# Patient Record
Sex: FEMALE | Race: WHITE | Employment: FULL TIME | URBAN - METROPOLITAN AREA
[De-identification: names, ages, dates, MRNs, and addresses within clinical notes are randomized per-mention and may not be internally consistent; named-entity substitution may affect disease eponyms.]

---

## 2017-03-15 ENCOUNTER — OFFICE VISIT (OUTPATIENT)
Dept: ORTHOPEDIC SURGERY | Age: 47
End: 2017-03-15

## 2017-03-15 VITALS
WEIGHT: 142 LBS | SYSTOLIC BLOOD PRESSURE: 178 MMHG | BODY MASS INDEX: 24.24 KG/M2 | HEART RATE: 125 BPM | DIASTOLIC BLOOD PRESSURE: 94 MMHG | RESPIRATION RATE: 16 BRPM | HEIGHT: 64 IN | TEMPERATURE: 97.6 F | OXYGEN SATURATION: 98 %

## 2017-03-15 DIAGNOSIS — M48.02 CERVICAL STENOSIS OF SPINE: Primary | ICD-10-CM

## 2017-03-15 NOTE — PATIENT INSTRUCTIONS
Spinal Stenosis: Care Instructions  Your Care Instructions    Spinal stenosis is a narrowing of the canal that surrounds the spinal cord and nerve roots. The spine is made up of bones, or vertebrae. The spinal cord runs through an opening in the bones called the spinal canal. Sometimes, bone and ligament and disc tissue grow into this canal and press on the nerves that branch out from the spinal cord. This causes pain, numbness, or weakness--most often in the arms, legs, feet, and rear end (buttocks). Spinal stenosis can happen as you age. It can occur in the lower back or the neck. You may be able to treat spinal stenosis with pain medicine and exercises to keep your spine strong and flexible. Your doctor may suggest physical therapy. Some people try cortisone (corticosteroid) shots to reduce swelling. However, you may need surgery if your pain and numbness are so bad that you cannot do normal activities. Follow-up care is a key part of your treatment and safety. Be sure to make and go to all appointments, and call your doctor if you are having problems. It's also a good idea to know your test results and keep a list of the medicines you take. How can you care for yourself at home? · Ask your doctor if you can take an over-the-counter pain medicine, such as acetaminophen (Tylenol), ibuprofen (Advil, Motrin), or naproxen (Aleve). Be safe with medicines. Read and follow all instructions on the label. · Reach and stay at a healthy weight. Too much weight is hard on your spine. · Change positions when sitting to ease pain. For example, lean forward. This may reduce pressure on the spinal cord and its nerves. · Stretch your back muscles as your doctor or physical therapist recommends. Here are a few exercises to try if your doctor says it is okay. ¨ Lie on your back and gently pull one bent knee to your chest. Put that foot back on the floor and then pull the other knee to your chest.  ¨ Do pelvic tilts. Lie on your back with your knees bent. Tighten your stomach muscles. Pull your belly button (navel) in and up toward your ribs. You should feel like your back is pressing to the floor and your hips and pelvis are slightly lifting off the floor. Hold for 6 seconds while breathing smoothly. ¨ Press your back flat against a wall and slide down into a half squat. Hold for 6 seconds while breathing normally. When should you call for help? Call 911 anytime you think you may need emergency care. For example, call if:  · You are unable to move a leg at all. Call your doctor now or seek immediate medical care if:  · You have new or worse symptoms in your arms, legs, chest, belly, or buttocks. Symptoms may include:  ¨ Numbness or tingling. ¨ Weakness. ¨ Pain. · You lose bladder or bowel control. Watch closely for changes in your health, and be sure to contact your doctor if:  · You are not getting better as expected. Where can you learn more? Go to http://ngozi-stefani.info/. Enter V369 in the search box to learn more about \"Spinal Stenosis: Care Instructions. \"  Current as of: May 23, 2016  Content Version: 11.1  © 6795-6118 [employer]. Care instructions adapted under license by [employer] (which disclaims liability or warranty for this information). If you have questions about a medical condition or this instruction, always ask your healthcare professional. [place] [address] any warranty or liability for your use of this information.

## 2017-03-15 NOTE — MR AVS SNAPSHOT
Visit Information Date & Time Provider Department Dept. Phone Encounter #  
 3/15/2017  2:45 PM Iesha Armenta MD [facility] [phone] [id number] Follow-up Instructions Return if symptoms worsen or fail to improve. Upcoming Health Maintenance Date Due HEMOGLOBIN A1C Q6M 1968 LIPID PANEL Q1 1968 FOOT EXAM Q1 8/6/1978 MICROALBUMIN Q1 8/6/1978 EYE EXAM RETINAL OR DILATED Q1 8/6/1978 Pneumococcal 19-64 Medium Risk (1 of 1 - PPSV23) 8/6/1987 DTaP/Tdap/Td series (1 - Tdap) 8/6/1989 PAP AKA CERVICAL CYTOLOGY 8/6/1989 INFLUENZA AGE 9 TO ADULT 8/1/2016 COLONOSCOPY 1/14/2019 Allergies as of 3/15/2017  Review Complete On: 3/15/2017 By: Ruma Cervantes Severity Noted Reaction Type Reactions Aspirin High   Anaphylaxis Nsaids (Non-steroidal Anti-inflammatory Drug) High 08/01/2014    Anaphylaxis Pcn [Penicillins]    Shortness of Breath, Swelling Tramadol  03/15/2017    Shortness of Breath Asthma symptoms Current Immunizations  Never Reviewed No immunizations on file. Not reviewed this visit You Were Diagnosed With   
  
 Codes Comments Cervical stenosis of spine    -  Primary ICD-10-CM: M48.02 
ICD-9-CM: 723.0 Vitals BP Pulse Temp Resp Height(growth percentile) Weight(growth percentile) (!) 178/94 (!) 125 97.6 °F (36.4 °C) (Oral) 16 5' 4\" (1.626 m) 142 lb (64.4 kg) SpO2 BMI OB Status Smoking Status 98% 24.37 kg/m2 Hysterectomy Current Every Day Smoker Vitals History BMI and BSA Data Body Mass Index Body Surface Area  
 24.37 kg/m 2 1.71 m 2 Preferred Pharmacy Pharmacy Name Phone [place], [address] [phone] Your Updated Medication List  
  
   
This list is accurate as of: 3/15/17  2:51 PM.  Always use your most recent med list.  
  
  
  
  
 acetaminophen 500 mg tablet Commonly known as:  TYLENOL Take 1,000 mg by mouth every four (4) hours. AMBIEN 10 mg tablet Generic drug:  zolpidem Take  by mouth nightly as needed for Sleep. ATIVAN 1 mg tablet Generic drug:  LORazepam  
Take 2 mg by mouth three (3) times daily. atorvastatin 20 mg tablet Commonly known as:  LIPITOR  
  
 cyanocobalamin 1,000 mcg/mL injection Commonly known as:  VITAMIN B12  
1,000 mcg every seven (7) days. CYMBALTA 60 mg capsule Generic drug:  DULoxetine Take 60 mg by mouth two (2) times a day. KLOR-CON M20 20 mEq tablet Generic drug:  potassium chloride Take 20 mEq by mouth as needed. LASIX 40 mg tablet Generic drug:  furosemide Take 40 mg by mouth two (2) times daily as needed. ondansetron 4 mg disintegrating tablet Commonly known as:  ZOFRAN ODT Take 1 Tab by mouth every eight (8) hours as needed for Nausea. * oxyCODONE IR 20 mg immediate release tablet Commonly known as:  Corrinne Mitten Take 1 Tab by mouth every eight (8) hours as needed. Max Daily Amount: 60 mg.  
  
 * oxyCODONE IR 20 mg immediate release tablet Commonly known as:  Corrinne Mitten Take 1 Tab by mouth three (3) times daily. Max Daily Amount: 60 mg.  
  
 * oxyCODONE ER 15 mg ER tablet Commonly known as:  OxyCONTIN Take 1 Tab by mouth every twelve (12) hours. Max Daily Amount: 30 mg. Indications: CHRONIC PAIN  
  
 * OxyCONTIN 30 mg Tr12 Generic drug:  oxyCODONE  
  
 * oxyCODONE CR 30 mg CR tablet Commonly known as:  OxyCONTIN Take 1 Tab by mouth every twelve (12) hours. Max Daily Amount: 60 mg.  
  
 pantoprazole 40 mg tablet Commonly known as:  PROTONIX Take 40 mg by mouth two (2) times a day. PHENERGAN PO Take 25 mg by mouth as needed. pramipexole 0.5 mg tablet Commonly known as:  MIRAPEX Take 0.5 mg by mouth nightly. predniSONE 10 mg tablet Commonly known as:  Jackie Cardenas  
 Take 30 mg by mouth daily as needed. Dose pack as needed  
  
 tiZANidine 6 mg capsule Commonly known as:  ZANAFLEX  
  
 triamcinolone 55 mcg nasal inhaler Commonly known as:  NASACORT AQ  
2 Sprays by Both Nostrils route daily. VITAMIN D2 50,000 unit capsule Generic drug:  ergocalciferol Take 50,000 Units by mouth every seven (7) days. ZANTAC 300 mg tablet Generic drug:  raNITIdine Take 300 mg by mouth daily. Takes at night * Notice: This list has 5 medication(s) that are the same as other medications prescribed for you. Read the directions carefully, and ask your doctor or other care provider to review them with you. We Performed the Following REFERRAL TO PAIN MANAGEMENT [[id number] Custom] Comments:  
 Manage chronic pain Follow-up Instructions Return if symptoms worsen or fail to improve. Referral Information Referral ID Referred By Referred To  
  
 [id number] JIGAR Garland Available Visits Status Start Date End Date 1 New Request 3/15/17 3/15/18 If your referral has a status of pending review or denied, additional information will be sent to support the outcome of this decision. Patient Instructions Spinal Stenosis: Care Instructions Your Care Instructions Spinal stenosis is a narrowing of the canal that surrounds the spinal cord and nerve roots. The spine is made up of bones, or vertebrae. The spinal cord runs through an opening in the bones called the spinal canal. Sometimes, bone and ligament and disc tissue grow into this canal and press on the nerves that branch out from the spinal cord. This causes pain, numbness, or weaknessmost often in the arms, legs, feet, and rear end (buttocks). Spinal stenosis can happen as you age. It can occur in the lower back or the neck.  
You may be able to treat spinal stenosis with pain medicine and exercises to keep your spine strong and flexible. Your doctor may suggest physical therapy. Some people try cortisone (corticosteroid) shots to reduce swelling. However, you may need surgery if your pain and numbness are so bad that you cannot do normal activities. Follow-up care is a key part of your treatment and safety. Be sure to make and go to all appointments, and call your doctor if you are having problems. It's also a good idea to know your test results and keep a list of the medicines you take. How can you care for yourself at home? · Ask your doctor if you can take an over-the-counter pain medicine, such as acetaminophen (Tylenol), ibuprofen (Advil, Motrin), or naproxen (Aleve). Be safe with medicines. Read and follow all instructions on the label. · Reach and stay at a healthy weight. Too much weight is hard on your spine. · Change positions when sitting to ease pain. For example, lean forward. This may reduce pressure on the spinal cord and its nerves. · Stretch your back muscles as your doctor or physical therapist recommends. Here are a few exercises to try if your doctor says it is okay. ¨ Lie on your back and gently pull one bent knee to your chest. Put that foot back on the floor and then pull the other knee to your chest. 
¨ Do pelvic tilts. Lie on your back with your knees bent. Tighten your stomach muscles. Pull your belly button (navel) in and up toward your ribs. You should feel like your back is pressing to the floor and your hips and pelvis are slightly lifting off the floor. Hold for 6 seconds while breathing smoothly. ¨ Press your back flat against a wall and slide down into a half squat. Hold for 6 seconds while breathing normally. When should you call for help? Call 911 anytime you think you may need emergency care. For example, call if: 
· You are unable to move a leg at all. Call your doctor now or seek immediate medical care if: · You have new or worse symptoms in your arms, legs, chest, belly, or buttocks. Symptoms may include: ¨ Numbness or tingling. ¨ Weakness. ¨ Pain. · You lose bladder or bowel control. Watch closely for changes in your health, and be sure to contact your doctor if: 
· You are not getting better as expected. Where can you learn more? Go to http://ngozi-stefani.info/. Enter V369 in the search box to learn more about \"Spinal Stenosis: Care Instructions. \" Current as of: May 23, 2016 Content Version: 11.1 © 1174-9440 [employer]. Care instructions adapted under license by [employer] (which disclaims liability or warranty for this information). If you have questions about a medical condition or this instruction, always ask your healthcare professional. [address] any warranty or liability for your use of this information. Introducing [facility]! Patience Aaron introduces [employer] patient portal. Now you can access parts of your medical record, email your doctor's office, and request medication refills online. 1. In your internet browser, go to [email or web address] 2. Click on the First Time User? Click Here link in the Sign In box. You will see the New Member Sign Up page. 3. Enter your [employer] Access Code exactly as it appears below. You will not need to use this code after youve completed the sign-up process. If you do not sign up before the expiration date, you must request a new code. · [employer] Access Code: [email or web address] Expires: 6/13/2017  2:51 PM 
 
4. Enter the last four digits of your Social Security Number (xxxx) and Date of Birth (mm/dd/yyyy) as indicated and click Submit. You will be taken to the next sign-up page. 5. Create a [employer] ID. This will be your [employer] login ID and cannot be changed, so think of one that is secure and easy to remember. 6. Create a Snaptu password. You can change your password at any time. 7. Enter your Password Reset Question and Answer. This can be used at a later time if you forget your password. 8. Enter your e-mail address. You will receive e-mail notification when new information is available in [address] 9. Click Sign Up. You can now view and download portions of your medical record. 10. Click the Download Summary menu link to download a portable copy of your medical information. If you have questions, please visit the Frequently Asked Questions section of the Snaptu website. Remember, Snaptu is NOT to be used for urgent needs. For medical emergencies, dial 911. Now available from your iPhone and Android! Please provide this summary of care documentation to your next provider. If you have any questions after today's visit, please call [phone].

## 2017-03-15 NOTE — PROGRESS NOTES
[address]  [address]  [place]  Phone: [phone]  Fax: [phone]        Herminia Johnson  : 1970  PCP: No primary care provider on file. 3/15/2017    NEW PATIENT      ASSESSMENT AND PLAN     Nicolasa Austin comes in to the office today c/o 6-10/10 cervical and lumbar pain x 1 year. Pt also has complaints of radiating right arm pain. She has previously had a C5-6 ACDF with Dr. Maddie Kebede in  and a left L4-5 laminectomy with Dr. Ghislaine Laboy in . She has had a cervical MRI which shows moderate C6-7 cervical stenosis caused by a moderate to large central disc protrusion. She does not show any obvious signs of sensory or motor deficits. She will see Dr. Ghislaine Laboy for a surgical consult. Pt was referred to pain management as well to manage her chronic pain. Pt was consulted on her smoking habits. We discussed the risks of smoking and she was advised to quit to improve her health. Pt will f/u with Dr. Ghislaine Laboy or herb. Edi Jasmine was seen today for neck pain and back pain. Diagnoses and all orders for this visit:    Cervical stenosis of spine  -     REFERRAL TO PAIN MANAGEMENT         Follow-up Disposition:  Return if symptoms worsen or fail to improve. CHIEF COMPLAINT  Nicolasa Austin is seen today in consultation at the request of No primary care provider on file. for complaints of cervical and lumbar pain. HISTORY OF PRESENT ILLNESS  Nicolasa Austin is a 48 y.o. female c/o 6-10/10 cervical and lumbar pain x 1 year. Pt also has complaints of radiating right arm pain. She has taken Oxycodone for her pain. Standing, walking, coughing, sneezing, lifting, and bending forward exacerbate her pain. She has had a C5-6 ACDF with Dr. Maddie Kebede in . Pt has also had a left L4-5 laminectomy with Dr. Ghislaine Laboy in 2016. She has fibromyalgia. Pt states she has tried Gabapentin and Lyrica without relief.     Pt denies any fevers, chills, nausea, vomiting. Pt denies any chest pain and shortness of breath. Pt denies any ear, nose, and throat problems. Pt denies any fecal or urinary incontinence. She smokes 2 packs per day. She is not working (last worked in 2015).       PAST MEDICAL HISTORY   Past Medical History:   Diagnosis Date    Arthritis     spondyloarthropathy    Asthma     H/O as a child    Cancer ([address] 1's    breast left    vulva    Chronic kidney disease     Depression     depression with anxiety    Diabetes ([address]     none since 2008    Endometriosis     Fibromyalgia     GERD (gastroesophageal reflux disease)     GI disease     High cholesterol     High triglycerides     Hypertension     2008 none since bypass    IBS (irritable bowel syndrome)     with chronic diarrhea and constipation    Lupus (HCC)     Migraines     Morbid obesity ([address]     Osteopenia     PUD (peptic ulcer disease)     Rheumatoid arthritis(714.0) ([address]     Sinus trouble     SOB (shortness of breath) on exertion        Past Surgical History:   Procedure Laterality Date    BIOPSY LIVER  07/01/2008    EGD  05/06/2009 and 3/4/2009    with biopsy    ENDOSCOPY, COLON, DIAGNOSTIC  01/14/2009    Colonoscopy with polypectom    ENDOSCOPY, COLON, DIAGNOSTIC  01/14/2009    colonoscopy with biopsy    HX BREAST LUMPECTOMY      HX CHOLECYSTECTOMY  11/18/2008    HX GASTRIC BYPASS  07/01/2008    lap-gastric bypass prodedure with a 100 cm Thiago limb bypass in antecolic and antegastric fashion    HX GYN      vulva resection    HX HYSTERECTOMY      HX OTHER SURGICAL  1982 (approx)    trach r/t mono diagnosis (tonsils swollen, unable to breathe)    HX TONSILLECTOMY      NEUROLOGICAL PROCEDURE UNLISTED      cervical fusion       MEDICATIONS      Current Outpatient Prescriptions   Medication Sig Dispense Refill    atorvastatin (LIPITOR) 20 mg tablet       tiZANidine (ZANAFLEX) 6 mg capsule       acetaminophen (TYLENOL) 500 mg tablet Take 1,000 mg by mouth every four (4) hours.  pramipexole (MIRAPEX) 0.5 mg tablet Take 0.5 mg by mouth nightly.  predniSONE (DELTASONE) 10 mg tablet Take 30 mg by mouth daily as needed. Dose pack as needed      cyanocobalamin (VITAMIN B12) 1,000 mcg/mL injection 1,000 mcg every seven (7) days.  pantoprazole (PROTONIX) 40 mg tablet Take 40 mg by mouth two (2) times a day.  ergocalciferol (VITAMIN D2) 50,000 unit capsule Take 50,000 Units by mouth every seven (7) days.  potassium chloride (KLOR-CON M20) 20 mEq tablet Take 20 mEq by mouth as needed.  furosemide (LASIX) 40 mg tablet Take 40 mg by mouth two (2) times daily as needed.  LORazepam (ATIVAN) 1 mg tablet Take 2 mg by mouth three (3) times daily.  zolpidem (AMBIEN) 10 mg tablet Take  by mouth nightly as needed for Sleep.  ranitidine (ZANTAC) 300 mg tablet Take 300 mg by mouth daily. Takes at night      ondansetron (ZOFRAN ODT) 4 mg disintegrating tablet Take 1 Tab by mouth every eight (8) hours as needed for Nausea. 30 Tab 0    PROMETHAZINE HCL (PHENERGAN PO) Take 25 mg by mouth as needed.  OXYCONTIN 30 mg TR12       oxyCODONE CR (OXYCONTIN) 30 mg CR tablet Take 1 Tab by mouth every twelve (12) hours. Max Daily Amount: 60 mg. 60 Tab 0    oxyCODONE IR (ROXICODONE) 20 mg immediate release tablet Take 1 Tab by mouth every eight (8) hours as needed. Max Daily Amount: 60 mg. 90 Tab 0    oxyCODONE IR (ROXICODONE) 20 mg immediate release tablet Take 1 Tab by mouth three (3) times daily. Max Daily Amount: 60 mg. 90 Tab 0    oxyCODONE ER (OXYCONTIN) 15 mg ER tablet Take 1 Tab by mouth every twelve (12) hours. Max Daily Amount: 30 mg. Indications: CHRONIC PAIN 60 Tab 0    triamcinolone (NASACORT AQ) 55 mcg nasal inhaler 2 Sprays by Both Nostrils route daily.  DULoxetine (CYMBALTA) 60 mg capsule Take 60 mg by mouth two (2) times a day.          ALLERGIES    Allergies   Allergen Reactions    Aspirin Anaphylaxis  Nsaids (Non-Steroidal Anti-Inflammatory Drug) Anaphylaxis    Pcn [Penicillins] Shortness of Breath and Swelling    Tramadol Shortness of Breath     Asthma symptoms          SOCIAL HISTORY    Social History     Social History    Marital status:      Spouse name: N/A    Number of children: N/A    Years of education: N/A     Social History Main Topics    Smoking status: Current Every Day Smoker     Packs/day: 1.00     Years: 1.00    Smokeless tobacco: Never Used    Alcohol use No    Drug use: No    Sexual activity: Yes     Partners: Male     Other Topics Concern    Not on file     Social History Narrative     Social History Narrative      Problem Relation Age of Onset    High Cholesterol Father     Cancer Father     Cancer Maternal Grandmother     Cancer Mother      cervical    Heart Attack Paternal Grandfather     Diabetes Maternal Aunt     Diabetes Maternal Uncle          REVIEW OF SYSTEMS  Review of Systems   Constitutional: Positive for chills, fever and weight loss. Negative for diaphoresis and malaise/fatigue. Respiratory: Negative for shortness of breath. Cardiovascular: Negative for chest pain and leg swelling. Gastrointestinal: Negative for constipation, nausea and vomiting. Neurological: Negative for dizziness, tingling, seizures, loss of consciousness and headaches. Psychiatric/Behavioral: The patient does not have insomnia. PHYSICAL EXAMINATION  Visit Vitals    BP (!) 178/94    Pulse (!) 125    Temp 97.6 °F (36.4 °C) (Oral)    Resp 16    Ht 5' 4\" (1.626 m)    Wt 142 lb (64.4 kg)    SpO2 98%    BMI 24.37 kg/m2         Pain Assessment  1/18/2016   Location of Pain Back   Location Modifiers -   Severity of Pain 8   Quality of Pain Aching; Sharp   Quality of Pain Comment stabbing at times   Duration of Pain Persistent   Frequency of Pain Constant   Aggravating Factors (No Data)   Aggravating Factors Comment doing too much too soon   Limiting Behavior Yes Relieving Factors -   Result of Injury -         Constitutional:  Well developed, well nourished, in no acute distress. Psychiatric: Affect and mood are appropriate. HEENT: Normocephalic, atraumatic. Extraocular movements intact. Integumentary: No rashes or abrasions noted on exposed areas. Cardiovascular: Regular rate and rhythm. Pulmonary: Clear to auscultation bilaterally. SPINE/MUSCULOSKELETAL EXAM    Cervical spine:  Neck is midline. Normal muscle tone. No focal atrophy is noted. Decreased ROM to the left and right. Shoulder ROM intact. Tenderness to palpation R>L. Negative Spurling's sign. Negative Tinel's sign. Negative Mcdonald's sign. Sensation in the bilateral arms grossly intact to light touch. Lumbar spine:  No rash, ecchymosis, or gross obliquity. No fasciculations. No focal atrophy is noted. No pain with hip ROM. Full range of motion. Tenderness to palpation. No tenderness to palpation at the sciatic notch. SI joints non-tender. Trochanters non tender. Sensation in the bilateral legs grossly intact to light touch. MOTOR:      Biceps  Triceps Deltoids Wrist Ext Wrist Flex Hand Intrin   Right 5/5 5/5 5/5 5/5 5/5 5/5   Left 5/5 5/5 5/5 5/5 5/5 5/5             Hip Flex  Quads Hamstrings Ankle DF EHL Ankle PF   Right 5/5 5/5 5/5 5/5 5/5 5/5   Left 5/5 5/5 5/5 5/5 5/5 5/5     DTRs are 2+ biceps, triceps, brachioradialis, patella, and Achilles. Negative Straight Leg raise. Squat not tested. No difficulty with tandem gait. Ambulation without assistive device. FWB. RADIOGRAPHS  Cervical MRI images taken on 12/20/2016 personally reviewed with patient:  The craniocervical and cervicomedullary junctions are within normal limits.   Anterior cervical fusion present at the C5-6 level.  No infiltrative marrow  signal is identified.  No evidence of fracture.  Cervical cord demonstrates  a normal signal.  No evidence of metastatic disease. No abnormal cord or dural enhancement following the administration of  contrast.  Paraspinal soft tissues are within normal limits. At C2-C3, there is minimal broad disc bulge without stenosis. At C3-C4, there is small broad disc protrusion with left-greater-than-right  uncinate hypertrophy which causes mild (0.9 cm AP) spinal canal stenosis  and moderate left neural foraminal stenosis. At C4-C5, there is a small central disc protrusion and bilateral uncinate  hypertrophy which causes mild (0.8 cm AP) spinal canal stenosis and  moderate bilateral neural foraminal stenosis. At C5-C6, there is anterior fusion.  No significant stenosis. [id number]             12/20/2016 2:49:31 PM  At C6-C7, there is a moderate to large central disc protrusion which  contacts the ventral cord causing moderate (0.6 cm AP) spinal canal  stenosis.  No significant neural foraminal stenosis. At C7-T1, there is no stenosis. IMPRESSION  1. No evidence of metastatic disease. 2. C6-C7 central disc protrusion contacting the ventral cord causing  moderate spinal canal stenosis. 3. Disc protrusions and uncinate hypertrophy at C3-4 and C4-5 level causing  mild spinal canal stenosis and neural foraminal stenosis as described  below.  reviewed    Ms. Marc Whiting has a reminder for a \"due or due soon\" health maintenance. I have asked that she contact her primary care provider for follow-up on this health maintenance. This plan was reviewed with the patient and patient agrees. All questions were answered. More than half of this visit today was spent on counseling. Written by Samantha Neumann, as dictated by Dr. Susan Cherry. I, Dr. Susan Cherry, confirm that all documentation is accurate.

## 2017-03-16 ENCOUNTER — TELEPHONE (OUTPATIENT)
Dept: ORTHOPEDIC SURGERY | Age: 47
End: 2017-03-16

## 2017-03-16 NOTE — TELEPHONE ENCOUNTER
Spoke with patient and told her that I would send her the list of pain management facilities to choose from since she lives in [place] and also she has [employer]. I explained that it may be a difficult task finding a practice that is close to her that takes her insurance.  She verbalized understanding and thanked me for sending the list. She asked that it be sent to her [address]

## 2017-05-23 ENCOUNTER — TELEPHONE (OUTPATIENT)
Dept: ORTHOPEDIC SURGERY | Age: 47
End: 2017-05-23

## 2017-05-23 ENCOUNTER — OFFICE VISIT (OUTPATIENT)
Dept: ORTHOPEDIC SURGERY | Age: 47
End: 2017-05-23

## 2017-05-23 VITALS
HEART RATE: 85 BPM | SYSTOLIC BLOOD PRESSURE: 107 MMHG | WEIGHT: 148.4 LBS | TEMPERATURE: 98.2 F | OXYGEN SATURATION: 98 % | RESPIRATION RATE: 18 BRPM | BODY MASS INDEX: 25.47 KG/M2 | DIASTOLIC BLOOD PRESSURE: 65 MMHG

## 2017-05-23 DIAGNOSIS — M48.02 CERVICAL SPINAL STENOSIS: Primary | ICD-10-CM

## 2017-05-23 DIAGNOSIS — M54.9 MID BACK PAIN: ICD-10-CM

## 2017-05-23 DIAGNOSIS — S12.9XXA FRACTURE OF SPINOUS PROCESS OF CERVICAL VERTEBRA, INITIAL ENCOUNTER (HCC): ICD-10-CM

## 2017-05-23 DIAGNOSIS — R60.0 BILATERAL EDEMA OF LOWER EXTREMITY: ICD-10-CM

## 2017-05-23 RX ORDER — DULOXETIN HYDROCHLORIDE 30 MG/1
CAPSULE, DELAYED RELEASE ORAL
COMMUNITY
Start: 2017-05-23 | End: 2020-02-20

## 2017-05-23 RX ORDER — OXYCODONE HYDROCHLORIDE 10 MG/1
10 TABLET ORAL
COMMUNITY
End: 2020-02-20

## 2017-05-23 RX ORDER — LORAZEPAM 2 MG/1
TABLET ORAL
COMMUNITY
Start: 2016-10-04 | End: 2020-02-20

## 2017-05-23 NOTE — MR AVS SNAPSHOT
Visit Information Date & Time Provider Department Dept. Phone Encounter #  
 5/23/2017  8:45 AM Leatha Cruz MD [facility] [phone] [id number] Follow-up Instructions Follow-up and Disposition History Upcoming Health Maintenance Date Due HEMOGLOBIN A1C Q6M 1968 LIPID PANEL Q1 1968 FOOT EXAM Q1 8/6/1978 MICROALBUMIN Q1 8/6/1978 EYE EXAM RETINAL OR DILATED Q1 8/6/1978 Pneumococcal 19-64 Medium Risk (1 of 1 - PPSV23) 8/6/1987 DTaP/Tdap/Td series (1 - Tdap) 8/6/1989 PAP AKA CERVICAL CYTOLOGY 8/6/1989 INFLUENZA AGE 9 TO ADULT 8/1/2017 COLONOSCOPY 1/14/2019 Allergies as of 5/23/2017  Review Complete On: 5/23/2017 By: Leatha Cruz MD  
  
 Severity Noted Reaction Type Reactions Aspirin High   Anaphylaxis Nsaids (Non-steroidal Anti-inflammatory Drug) High 08/01/2014    Anaphylaxis Pcn [Penicillins]    Shortness of Breath, Swelling Tramadol  03/15/2017    Shortness of Breath Asthma symptoms Current Immunizations  Never Reviewed No immunizations on file. Not reviewed this visit You Were Diagnosed With   
  
 Codes Comments Cervical spinal stenosis    -  Primary ICD-10-CM: M48.02 
ICD-9-CM: 723.0 Mid back pain     ICD-10-CM: M54.9 ICD-9-CM: 724.5 Fracture of spinous process of cervical vertebra, initial encounter ([facility].)     ICD-10-CM: S12. 9XXA ICD-9-CM: 805.00 Bilateral edema of lower extremity     ICD-10-CM: R60.0 ICD-9-CM: 517. 3 Vitals BP Pulse Temp Resp Weight(growth percentile) SpO2  
 107/65 85 98.2 °F (36.8 °C) (Oral) 18 148 lb 6.4 oz (67.3 kg) 98% BMI OB Status Smoking Status 25.47 kg/m2 Hysterectomy Current Every Day Smoker BMI and BSA Data Body Mass Index Body Surface Area  
 25.47 kg/m 2 1.74 m 2 [facility] [place] [facility] [place] [address] [phone] Your Updated Medication List  
  
   
This list is accurate as of: 5/23/17 10:05 AM.  Always use your most recent med list.  
  
  
  
  
 acetaminophen 500 mg tablet Commonly known as:  TYLENOL Take 1,000 mg by mouth four (4) times daily as needed. AMBIEN 10 mg tablet Generic drug:  zolpidem Take  by mouth nightly as needed for Sleep. * ATIVAN 1 mg tablet Generic drug:  LORazepam  
Take 2 mg by mouth three (3) times daily. * ATIVAN 2 mg tablet Generic drug:  LORazepam  
Reported on 4/1/2017  
  
 atorvastatin 20 mg tablet Commonly known as:  LIPITOR Take 20 mg by mouth daily. cyanocobalamin 1,000 mcg/mL injection Commonly known as:  VITAMIN B12  
1,000 mcg every seven (7) days. * CYMBALTA 60 mg capsule Generic drug:  DULoxetine Take 60 mg by mouth two (2) times a day. * DULoxetine 30 mg capsule Commonly known as:  CYMBALTA 1 in the am and 2 po qhs GABAPENTIN PO Take  by mouth three (3) times daily. KLOR-CON M20 20 mEq tablet Generic drug:  potassium chloride Take 20 mEq by mouth as needed. LASIX 40 mg tablet Generic drug:  furosemide Take 40 mg by mouth two (2) times daily as needed. ondansetron 4 mg disintegrating tablet Commonly known as:  ZOFRAN ODT Take 1 Tab by mouth every eight (8) hours as needed for Nausea. * oxyCODONE IR 10 mg Tab immediate release tablet Commonly known as:  Gar Spine Take 10 mg by mouth. Every 4-6 hours * oxyCODONE IR 20 mg immediate release tablet Commonly known as:  Gar Spine Take 1 Tab by mouth every eight (8) hours as needed. Max Daily Amount: 60 mg.  
  
 * oxyCODONE IR 20 mg immediate release tablet Commonly known as:  Gar Spine Take 1 Tab by mouth three (3) times daily. Max Daily Amount: 60 mg.  
  
 * oxyCODONE ER 15 mg ER tablet Commonly known as:  OxyCONTIN Take 1 Tab by mouth every twelve (12) hours. Max Daily Amount: 30 mg. Indications: CHRONIC PAIN  
  
 * OxyCONTIN 30 mg Tr12 Generic drug:  oxyCODONE  
  
 * oxyCODONE CR 30 mg CR tablet Commonly known as:  OxyCONTIN Take 1 Tab by mouth every twelve (12) hours. Max Daily Amount: 60 mg.  
  
 pantoprazole 40 mg tablet Commonly known as:  PROTONIX Take 40 mg by mouth two (2) times a day. PHENERGAN PO Take 25 mg by mouth as needed. pramipexole 0.5 mg tablet Commonly known as:  MIRAPEX Take 0.5 mg by mouth nightly. predniSONE 10 mg tablet Commonly known as:  Gina Jimmy Take 30 mg by mouth daily as needed. Dose pack as needed  
  
 tiZANidine 6 mg capsule Commonly known as:  ZANAFLEX  
  
 triamcinolone 55 mcg nasal inhaler Commonly known as:  NASACORT AQ  
2 Sprays by Both Nostrils route daily. VITAMIN D2 50,000 unit capsule Generic drug:  ergocalciferol Take 50,000 Units by mouth every seven (7) days. ZANTAC 300 mg tablet Generic drug:  raNITIdine Take 300 mg by mouth daily. Takes at night * Notice: This list has 10 medication(s) that are the same as other medications prescribed for you. Read the directions carefully, and ask your doctor or other care provider to review them with you. We Performed the Following AMB POC XRAY, SPINE, CERVICAL; 4+ VIE [69765 CPT(R)] AMB POC XRAY, SPINE, LUMBOSACRAL; 2 O [54382 CPT(R)] AMB SUPPLY ORDER [[id number] Custom] Comments:  
 Soft cervical collar. POC XRAY, SPINE; THOR-LUMB SP, 2 VIEW [69985 CPT(R)] To-Do List   
 05/23/2017 Imaging:  DUPLEX LOWER EXT VENOUS BILAT Patient Instructions [employer] Activation Thank you for requesting access to [employer]. Please follow the instructions below to securely access and download your online medical record.  Live[employer] allows you to send messages to your doctor, view your test results, renew your prescriptions, schedule appointments, and more. How Do I Sign Up? 1. In your internet browser, go to [email or web address] 
2. Click on the First Time User? Click Here link in the Sign In box. You will be redirect to the New Member Sign Up page. 3. Enter your [employer] Access Code exactly as it appears below. You will not need to use this code after youve completed the sign-up process. If you do not sign up before the expiration date, you must request a new code. [employer] Access Code: [email or web address] Expires: 2017  2:51 PM (This is the date your [employer] access code will ) 4. Enter the last four digits of your Social Security Number (xxxx) and Date of Birth (mm/dd/yyyy) as indicated and click Submit. You will be taken to the next sign-up page. 5. Create a [employer] ID. This will be your [employer] login ID and cannot be changed, so think of one that is secure and easy to remember. 6. Create a [employer] password. You can change your password at any time. 7. Enter your Password Reset Question and Answer. This can be used at a later time if you forget your password. 8. Enter your e-mail address. You will receive e-mail notification when new information is available in [address] 9. Click Sign Up. You can now view and download portions of your medical record. 10. Click the Download Summary menu link to download a portable copy of your medical information. Additional Information If you have questions, please visit the Frequently Asked Questions section of the [employer] website at [email or web address] Remember, [employer] is NOT to be used for urgent needs. For medical emergencies, dial 911. Stopping Smoking: Care Instructions Your Care Instructions Cigarette smokers crave the nicotine in cigarettes. Giving it up is much harder than simply changing a habit.  Your body has to stop craving the nicotine. It is hard to quit, but you can do it. There are many tools that people use to quit smoking. You may find that combining tools works best for you. There are several steps to quitting. First you get ready to quit. Then you get support to help you. After that, you learn new skills and behaviors to become a nonsmoker. For many people, a necessary step is getting and using medicine. Your doctor will help you set up the plan that best meets your needs. You may want to attend a smoking cessation program to help you quit smoking. When you choose a program, look for one that has proven success. Ask your doctor for ideas. You will greatly increase your chances of success if you take medicine as well as get counseling or join a cessation program. 
Some of the changes you feel when you first quit tobacco are uncomfortable. Your body will miss the nicotine at first, and you may feel short-tempered and grumpy. You may have trouble sleeping or concentrating. Medicine can help you deal with these symptoms. You may struggle with changing your smoking habits and rituals. The last step is the tricky one: Be prepared for the smoking urge to continue for a time. This is a lot to deal with, but keep at it. You will feel better. Follow-up care is a key part of your treatment and safety. Be sure to make and go to all appointments, and call your doctor if you are having problems. Its also a good idea to know your test results and keep a list of the medicines you take. How can you care for yourself at home? · Ask your family, friends, and coworkers for support. You have a better chance of quitting if you have help and support. · Join a support group, such as Nicotine Anonymous, for people who are trying to quit smoking. · Consider signing up for a smoking cessation program, such as the American Lung Association's Freedom from Smoking program. 
· Set a quit date.  Pick your date carefully so that it is not right in the middle of a big deadline or stressful time. Once you quit, do not even take a puff. Get rid of all ashtrays and lighters after your last cigarette. Clean your house and your clothes so that they do not smell of smoke. · Learn how to be a nonsmoker. Think about ways you can avoid those things that make you reach for a cigarette. ¨ Avoid situations that put you at greatest risk for smoking. For some people, it is hard to have a drink with friends without smoking. For others, they might skip a coffee break with coworkers who smoke. ¨ Change your daily routine. Take a different route to work or eat a meal in a different place. · Cut down on stress. Calm yourself or release tension by doing an activity you enjoy, such as reading a book, taking a hot bath, or gardening. · Talk to your doctor or pharmacist about nicotine replacement therapy, which replaces the nicotine in your body. You still get nicotine but you do not use tobacco. Nicotine replacement products help you slowly reduce the amount of nicotine you need. These products come in several forms, many of them available over-the-counter: ¨ Nicotine patches ¨ Nicotine gum and lozenges ¨ Nicotine inhaler · Ask your doctor about bupropion (Wellbutrin) or varenicline (Chantix), which are prescription medicines. They do not contain nicotine. They help you by reducing withdrawal symptoms, such as stress and anxiety. · Some people find hypnosis, acupuncture, and massage helpful for ending the smoking habit. · Eat a healthy diet and get regular exercise. Having healthy habits will help your body move past its craving for nicotine. · Be prepared to keep trying. Most people are not successful the first few times they try to quit. Do not get mad at yourself if you smoke again. Make a list of things you learned and think about when you want to try again, such as next week, next month, or next year. Where can you learn more? Go to http://ngozi-stefani.info/. Enter M329 in the search box to learn more about \"Stopping Smoking: Care Instructions. \" Current as of: May 26, 2016 Content Version: 11.2 © 1496-3554 Farecast. Care instructions adapted under license by Sp[employer] Backup (which disclaims liability or warranty for this information). If you have questions about a medical condition or this instruction, always ask your healthcare professional. [address] any warranty or liability for your use of this information. Patient Instructions History Introducing [facility]! Lalo Townsend introduces Send the Trend patient portal. Now you can access parts of your medical record, email your doctor's office, and request medication refills online. 1. In your internet browser, go to [email or web address] 2. Click on the First Time User? Click Here link in the Sign In box. You will see the New Member Sign Up page. 3. Enter your Send the Trend Access Code exactly as it appears below. You will not need to use this code after youve completed the sign-up process. If you do not sign up before the expiration date, you must request a new code. · [employer] Trend Access Code: [email or web address] Expires: 6/13/2017  2:51 PM 
 
4. Enter the last four digits of your Social Security Number (xxxx) and Date of Birth (mm/dd/yyyy) as indicated and click Submit. You will be taken to the next sign-up page. 5. Create a Send the Trend ID. This will be your Send the Trend login ID and cannot be changed, so think of one that is secure and easy to remember. 6. Create a Send the Trend password. You can change your password at any time. 7. Enter your Password Reset Question and Answer. This can be used at a later time if you forget your password. 8. Enter your e-mail address. You will receive e-mail notification when new information is available in [address] 9. Click Sign Up. You can now view and download portions of your medical record. 10. Click the Download Summary menu link to download a portable copy of your medical information. If you have questions, please visit the Frequently Asked Questions section of the ThoughtLeadr website. Remember, [employer] is NOT to be used for urgent needs. For medical emergencies, dial 911. Now available from your iPhone and Android! Please provide this summary of care documentation to your next provider. Your primary care clinician is listed as Luly Gill. If you have any questions after today's visit, please call [phone].

## 2017-05-23 NOTE — TELEPHONE ENCOUNTER
Called pt (2nd time) to inquire/verify facility where she was admitted for cervical trauma. Voice message left to return call to myself at facility with office number.

## 2017-05-23 NOTE — PROGRESS NOTES
Per Dr. Melony Desouza request, pt's entire medical record, discharge summary, radiology testing images and reports were requested from [address] via fax ([phone]). Spoke to medical records prior to faxing to make sure this was the right number. Waiver signed by pt to have the above mailed to our office. Fax confirmations received, and scanned into pt's chart along with authorization to release medical information.

## 2017-05-23 NOTE — PATIENT INSTRUCTIONS
[facility] Activation    Thank you for requesting access to [facility]. Please follow the instructions below to securely access and download your online medical record. [facility] allows you to send messages to your doctor, view your test results, renew your prescriptions, schedule appointments, and more. How Do I Sign Up? 1. In your internet browser, go to [email or web address]  2. Click on the First Time User? Click Here link in the Sign In box. You will be redirect to the New Member Sign Up page. 3. Enter your [facility] Access Code exactly as it appears below. You will not need to use this code after youve completed the sign-up process. If you do not sign up before the expiration date, you must request a new code. [facility] Access Code: [email or web address]  Expires: 2017  2:51 PM (This is the date your [facility] access code will )    4. Enter the last four digits of your Social Security Number (xxxx) and Date of Birth (mm/dd/yyyy) as indicated and click Submit. You will be taken to the next sign-up page. 5. Create a [facility] ID. This will be your [facility] login ID and cannot be changed, so think of one that is secure and easy to remember. 6. Create a [facility] password. You can change your password at any time. 7. Enter your Password Reset Question and Answer. This can be used at a later time if you forget your password. 8. Enter your e-mail address. You will receive e-mail notification when new information is available in [address] 9. Click Sign Up. You can now view and download portions of your medical record. 10. Click the Download Summary menu link to download a portable copy of your medical information. Additional Information    If you have questions, please visit the Frequently Asked Questions section of the [facility] website at [email or web address] [email or web address]/. Remember, [facility] is NOT to be used for urgent needs. For medical emergencies, dial 911.          Stopping Smoking: Care Instructions  Your Care Instructions  Cigarette smokers crave the nicotine in cigarettes. Giving it up is much harder than simply changing a habit. Your body has to stop craving the nicotine. It is hard to quit, but you can do it. There are many tools that people use to quit smoking. You may find that combining tools works best for you. There are several steps to quitting. First you get ready to quit. Then you get support to help you. After that, you learn new skills and behaviors to become a nonsmoker. For many people, a necessary step is getting and using medicine. Your doctor will help you set up the plan that best meets your needs. You may want to attend a smoking cessation program to help you quit smoking. When you choose a program, look for one that has proven success. Ask your doctor for ideas. You will greatly increase your chances of success if you take medicine as well as get counseling or join a cessation program.  Some of the changes you feel when you first quit tobacco are uncomfortable. Your body will miss the nicotine at first, and you may feel short-tempered and grumpy. You may have trouble sleeping or concentrating. Medicine can help you deal with these symptoms. You may struggle with changing your smoking habits and rituals. The last step is the tricky one: Be prepared for the smoking urge to continue for a time. This is a lot to deal with, but keep at it. You will feel better. Follow-up care is a key part of your treatment and safety. Be sure to make and go to all appointments, and call your doctor if you are having problems. Its also a good idea to know your test results and keep a list of the medicines you take. How can you care for yourself at home? · Ask your family, friends, and coworkers for support. You have a better chance of quitting if you have help and support. · Join a support group, such as Nicotine Anonymous, for people who are trying to quit smoking.   · Consider signing up for a smoking cessation program, such as the American Lung Association's Freedom from Smoking program.  · Set a quit date. Pick your date carefully so that it is not right in the middle of a big deadline or stressful time. Once you quit, do not even take a puff. Get rid of all ashtrays and lighters after your last cigarette. Clean your house and your clothes so that they do not smell of smoke. · Learn how to be a nonsmoker. Think about ways you can avoid those things that make you reach for a cigarette. ¨ Avoid situations that put you at greatest risk for smoking. For some people, it is hard to have a drink with friends without smoking. For others, they might skip a coffee break with coworkers who smoke. ¨ Change your daily routine. Take a different route to work or eat a meal in a different place. · Cut down on stress. Calm yourself or release tension by doing an activity you enjoy, such as reading a book, taking a hot bath, or gardening. · Talk to your doctor or pharmacist about nicotine replacement therapy, which replaces the nicotine in your body. You still get nicotine but you do not use tobacco. Nicotine replacement products help you slowly reduce the amount of nicotine you need. These products come in several forms, many of them available over-the-counter:  ¨ Nicotine patches  ¨ Nicotine gum and lozenges  ¨ Nicotine inhaler  · Ask your doctor about bupropion (Wellbutrin) or varenicline (Chantix), which are prescription medicines. They do not contain nicotine. They help you by reducing withdrawal symptoms, such as stress and anxiety. · Some people find hypnosis, acupuncture, and massage helpful for ending the smoking habit. · Eat a healthy diet and get regular exercise. Having healthy habits will help your body move past its craving for nicotine. · Be prepared to keep trying. Most people are not successful the first few times they try to quit. Do not get mad at yourself if you smoke again.  Make a list of things you learned and think about when you want to try again, such as next week, next month, or next year. Where can you learn more? Go to http://nogzi-stefani.info/. Enter M032 in the search box to learn more about \"Stopping Smoking: Care Instructions. \"  Current as of: May 26, 2016  Content Version: 11.2  © 4830-0743 [employer]. Care instructions adapted under license by Advanced Power Projects (which disclaims liability or warranty for this information). If you have questions about a medical condition or this instruction, always ask your healthcare professional. [place] [address] any warranty or liability for your use of this information.

## 2017-05-23 NOTE — TELEPHONE ENCOUNTER
Called pt to inquire further of the facility where the trauma admission took place. Received documentation from [facility] of Rheumatology visit and mammogram only. Dr. Katherine Mccollum needs info r/t trauma admission for cervical fx. Left a voice message with my name, facility, and number.
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
## 2017-05-24 ENCOUNTER — HOSPITAL ENCOUNTER (OUTPATIENT)
Dept: VASCULAR SURGERY | Age: 47
Discharge: HOME | End: 2017-05-24
Payer: COMMERCIAL

## 2017-05-24 DIAGNOSIS — R60.0 BILATERAL EDEMA OF LOWER EXTREMITY: ICD-10-CM

## 2017-05-24 PROCEDURE — 93970 EXTREMITY STUDY: CPT

## 2017-05-25 NOTE — TELEPHONE ENCOUNTER
Called medical records with [facility] ([phone]) to inquire of pt's entire medical record. Only received office visit reports from Rheumatology on 05/23/17. Dr. Magy Howard needs entire medical record, faxed authorization to disclose health information for entire record, x-rays or imaging report, and discharge summary. Call was transferred to another extension, voice mail left to return call to office.

## 2017-05-26 NOTE — TELEPHONE ENCOUNTER
Patient's PCP,  Gerardo Mitchell, called to request that we include her with any updates on patient's care plan. Dr. Alanna Habermann is seeing patient on Tuesday, but is unable to assist with long-term pain mgmt and is inquiring as to what Dr. Joya Little might be for her.   Dr. Craig Coe cell phone # [phone]

## 2017-05-26 NOTE — TELEPHONE ENCOUNTER
Let PCP know that she was recently seen by Dr Kamla Franklin and we are trying to get her records from [facility]. We are not giving her pain medication and referred her to [facility] back in January, but she ended up declining the appt stating she was going to get her pain meds from her PCP. We do not do chronic pain management. She really needs to go to a pain clinic.

## 2017-05-30 NOTE — TELEPHONE ENCOUNTER
Left voice message for Dr. Iris Thompson of [facility] note regarding patient. Informed to call back if there were further questions that needed to be answered.

## 2017-06-01 ENCOUNTER — TELEPHONE (OUTPATIENT)
Dept: ORTHOPEDIC SURGERY | Age: 47
End: 2017-06-01

## 2017-06-01 ENCOUNTER — DOCUMENTATION ONLY (OUTPATIENT)
Dept: ORTHOPEDIC SURGERY | Age: 47
End: 2017-06-01

## 2017-06-01 NOTE — TELEPHONE ENCOUNTER
Dr. Cinda Wolf called back to speak to [place] again regarding patient. She can be reached at [phone], her  will be able to get her to phone using this number.

## 2017-06-01 NOTE — PROGRESS NOTES
Please make sure this patient schedules a follow-up w/ Dr. Kathy Hernandez after we receive the MRI pictures from [facility]. We need the images, not just report.

## 2017-06-01 NOTE — TELEPHONE ENCOUNTER
Re-faxed authorization for health information to [phone]. [facility] for status update. Left detailed voice message regarding pt's name and  and what is being requested (entire record, xrays and imaging report (mailed to our office), discharge summary. Also left office name, number and my name as contact.

## 2017-06-01 NOTE — TELEPHONE ENCOUNTER
Spoke with Dr. Louis Paiz regarding patient's plan of care. At this point we are still waiting for MRI images from [facility] and her MRs. At which point, Dr. Gloria Pulido will review and determine whether she needs surgery or not. At this point, we will not take over long-term pain management for this patient. She has been discharged from [facility] in the past per Dr. Louis Paiz. Please notify me when her records come in. Dr. Louis Paiz stated that she will fax what reports she has from [facility] to our direct fax [phone].

## 2017-06-02 NOTE — TELEPHONE ENCOUNTER
Received call from [facility]. and was informed the request is currently being processed and will take 10-15 business days. I was transferred to radiology dept to inquire about status of all film studies requested. I was informed there is a CT Cervical Spine done on 05/03/2017 and will take 5-8 business days to mail images. Faxed authorization for health information to radiology at [phone], fax confirmation received.

## 2017-06-12 NOTE — TELEPHONE ENCOUNTER
Please call pt and schedule f/u appt with Dr. Cadence Mandujano. Diagnostic Imaging CD received from [facility] and placed in black tray at NS #1 beside printer until pt's appt.

## 2017-06-12 NOTE — TELEPHONE ENCOUNTER
Received a call from Ana Maria Horan in [facility], that pt is scheduled for upcoming appt on 06/27/2017 with Dr. Otto Giron.

## 2017-06-27 ENCOUNTER — OFFICE VISIT (OUTPATIENT)
Dept: ORTHOPEDIC SURGERY | Age: 47
End: 2017-06-27

## 2017-06-27 VITALS
BODY MASS INDEX: 24.55 KG/M2 | DIASTOLIC BLOOD PRESSURE: 92 MMHG | RESPIRATION RATE: 18 BRPM | WEIGHT: 143 LBS | HEART RATE: 89 BPM | SYSTOLIC BLOOD PRESSURE: 142 MMHG | OXYGEN SATURATION: 98 % | TEMPERATURE: 98.3 F

## 2017-06-27 DIAGNOSIS — S12.9XXS: ICD-10-CM

## 2017-06-27 DIAGNOSIS — M48.02 CERVICAL SPINAL STENOSIS: ICD-10-CM

## 2017-06-27 DIAGNOSIS — M48.02 CERVICAL SPINAL STENOSIS: Primary | ICD-10-CM

## 2017-06-27 RX ORDER — CYCLOBENZAPRINE HYDROCHLORIDE 15 MG/1
CAPSULE, EXTENDED RELEASE ORAL
COMMUNITY
Start: 2017-06-26 | End: 2020-02-20

## 2017-06-27 RX ORDER — CEFUROXIME AXETIL 250 MG/1
0.6 TABLET ORAL
COMMUNITY
Start: 2017-06-26 | End: 2020-02-20

## 2017-06-27 RX ORDER — LATANOPROST 50 UG/ML
1 SOLUTION/ DROPS OPHTHALMIC
COMMUNITY
End: 2020-02-20

## 2017-06-27 RX ORDER — LIDOCAINE 50 MG/G
PATCH TOPICAL
COMMUNITY
Start: 2017-06-26

## 2017-06-27 NOTE — PROGRESS NOTES
[address] Spine Specialist   Pre-Surgical Worksheet    Patient: Elin Singh                         MRN: [id number]     Age:  48 y.o.,      Sex: female    YOB: 1970           BRYANT: June 27, 2017  PCP: Phan Shipman MD    Allergies   Allergen Reactions    Aspirin Anaphylaxis    Nsaids (Non-Steroidal Anti-Inflammatory Drug) Anaphylaxis    Pcn [Penicillins] Shortness of Breath and Swelling    Tramadol Shortness of Breath     Asthma symptoms         ICD-10-CM ICD-9-CM    1. Cervical spinal stenosis M48.02 723.0    2. Fracture of spinous process of cervical vertebra, sequela S12. 9XXS 905.1        Surgery: ACDF C3/4 C4/5 C6/7 REMOVAL PLATE C7/9. Pain Assessment   Pain Assessment  6/27/2017   Location of Pain Neck   Location Modifiers -   Severity of Pain 8   Quality of Pain Aching;Burning   Quality of Pain Comment numbness, tingling, weakness in right leg and bilateral hands   Duration of Pain Persistent   Frequency of Pain Constant   Aggravating Factors Bending;Stretching;Straightening;Exercise;Kneeling;Squatting;Standing;Walking;Stairs   Aggravating Factors Comment -   Limiting Behavior Some   Relieving Factors Rest;Nothing   Relieving Factors Comment -   Result of Injury No       Visit Vitals    BP (!) 142/92    Pulse 89    Temp 98.3 °F (36.8 °C) (Oral)    Resp 18    Wt 143 lb (64.9 kg)    SpO2 98%    BMI 24.55 kg/m2       ADL Limits: Bathing, Dressing and Cane. Pt states she has frequent falls due to the neck injury despite ambulating with a cane. Pt states she has constant pain that is worsened with activity. Spine Surgery?: Yes When 01/2016. Where Dr. Iva Trevizo. Spinal Injections?: Yes  When Several over the years. Where unknown. Physical Therapy?: Yes  When in the past.  Where unknown. NSAID's?: Yes    Pain Medications?: Yes:   Type: Amrix, Lidoderm patches.     In Pain Management: NO, Where: N/A    Current Outpatient Prescriptions   Medication Sig    Cyclobenzaprine (AMRIX) 15 mg cp24 SR capsule Take 1-2 caps at 6 pm daily for muscle spasm.  lidocaine (LIDODERM) 5 % Apply up to 3 patches for 12 hours/day.  SUMAtriptan succinate 6 mg/0.5 mL kit 0.6 mg by SubCUTAneous route.  latanoprost (XALATAN) 0.005 % ophthalmic solution 1 Drop.  DULoxetine (CYMBALTA) 30 mg capsule 1 in the am and 1 po qhs    LORazepam (ATIVAN) 2 mg tablet Reported on 4/1/2017    atorvastatin (LIPITOR) 20 mg tablet Take 20 mg by mouth daily.  acetaminophen (TYLENOL) 500 mg tablet Take 1,000 mg by mouth four (4) times daily as needed.  pramipexole (MIRAPEX) 0.5 mg tablet Take 0.5 mg by mouth nightly.  cyanocobalamin (VITAMIN B12) 1,000 mcg/mL injection 1,000 mcg every seven (7) days.  pantoprazole (PROTONIX) 40 mg tablet Take 40 mg by mouth two (2) times a day.  ergocalciferol (VITAMIN D2) 50,000 unit capsule Take 50,000 Units by mouth every seven (7) days.  potassium chloride (KLOR-CON M20) 20 mEq tablet Take 20 mEq by mouth as needed.  furosemide (LASIX) 40 mg tablet Take 40 mg by mouth two (2) times daily as needed.  zolpidem (AMBIEN) 10 mg tablet Take  by mouth nightly as needed for Sleep.  ranitidine (ZANTAC) 300 mg tablet Take 300 mg by mouth daily. Takes at night    ondansetron (ZOFRAN ODT) 4 mg disintegrating tablet Take 1 Tab by mouth every eight (8) hours as needed for Nausea.  PROMETHAZINE HCL (PHENERGAN PO) Take 25 mg by mouth as needed.  oxyCODONE IR (ROXICODONE) 10 mg tab immediate release tablet Take 10 mg by mouth. Every 4-6 hours    GABAPENTIN PO Take  by mouth three (3) times daily.  tiZANidine (ZANAFLEX) 6 mg capsule 6 mg.    OXYCONTIN 30 mg TR12     oxyCODONE CR (OXYCONTIN) 30 mg CR tablet Take 1 Tab by mouth every twelve (12) hours. Max Daily Amount: 60 mg.    oxyCODONE IR (ROXICODONE) 20 mg immediate release tablet Take 1 Tab by mouth every eight (8) hours as needed.  Max Daily Amount: 60 mg.    oxyCODONE IR (ROXICODONE) 20 mg immediate release tablet Take 1 Tab by mouth three (3) times daily. Max Daily Amount: 60 mg.    oxyCODONE ER (OXYCONTIN) 15 mg ER tablet Take 1 Tab by mouth every twelve (12) hours. Max Daily Amount: 30 mg. Indications: CHRONIC PAIN    predniSONE (DELTASONE) 10 mg tablet Take 30 mg by mouth daily as needed. Dose pack as needed    triamcinolone (NASACORT AQ) 55 mcg nasal inhaler 2 Sprays by Both Nostrils route daily.  DULoxetine (CYMBALTA) 60 mg capsule Take 60 mg by mouth two (2) times a day.  LORazepam (ATIVAN) 1 mg tablet Take 2 mg by mouth three (3) times daily. No current facility-administered medications for this visit.         Past Medical History:   Diagnosis Date    Arthritis     spondyloarthropathy    Asthma     H/O as a child    Cancer ([facility] [address] 1's    breast left    vulva    Chronic kidney disease     Depression     depression with anxiety    Diabetes ([facility] [address]     none since 2008    Endometriosis     Fibromyalgia     GERD (gastroesophageal reflux disease)     GI disease     High cholesterol     High triglycerides     Hypertension     2008 none since bypass    IBS (irritable bowel syndrome)     with chronic diarrhea and constipation    Lupus (HCC)     Migraines     Morbid obesity (HCC)     Osteopenia     PUD (peptic ulcer disease)     Rheumatoid arthritis ([facility]     Sinus trouble     SOB (shortness of breath) on exertion        Past Surgical History:   Procedure Laterality Date    BIOPSY LIVER  07/01/2008    EGD  05/06/2009 and 3/4/2009    with biopsy    ENDOSCOPY, COLON, DIAGNOSTIC  01/14/2009    Colonoscopy with polypectom    ENDOSCOPY, COLON, DIAGNOSTIC  01/14/2009    colonoscopy with biopsy    HX BREAST LUMPECTOMY      HX CHOLECYSTECTOMY  11/18/2008    HX GASTRIC BYPASS  07/01/2008    lap-gastric bypass prodedure with a 100 cm Thiago limb bypass in antecolic and antegastric fashion    HX GYN      vulva resection    HX HYSTERECTOMY      HX OTHER SURGICAL  1982 (approx)    trach r/t mono diagnosis (tonsils swollen, unable to breathe)    HX TONSILLECTOMY      NEUROLOGICAL PROCEDURE UNLISTED      cervical fusion       Social History     Social History    Marital status:      Spouse name: N/A    Number of children: N/A    Years of education: N/A     Social History Main Topics    Smoking status: Current Every Day Smoker     Packs/day: 1.00     Years: 1.00    Smokeless tobacco: Never Used    Alcohol use No    Drug use: No    Sexual activity: Yes     Partners: Male     Other Topics Concern    None     Social History Narrative

## 2017-06-27 NOTE — PATIENT INSTRUCTIONS
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
## 2017-11-29 ENCOUNTER — TELEPHONE (OUTPATIENT)
Dept: ORTHOPEDIC SURGERY | Age: 47
End: 2017-11-29

## 2017-11-29 NOTE — TELEPHONE ENCOUNTER
Patient called back to see if Dr. Ros Cornejo had reviewed her request yet. Please contact patient to advise.

## 2017-11-29 NOTE — TELEPHONE ENCOUNTER
I would also recommend she contact the surgeon who preformed this for immediate pain issues. If she is having nerologicaly deficitis/ weakness, she needs to go to the ER.

## 2017-11-29 NOTE — TELEPHONE ENCOUNTER
Patient called stating she is in a lot of pain due to the screw in her neck being half in and half out. She states she can't even hold her head up straight. She is requesting some kind of pain medication and also to see if she can be seen before 12/12/17. Please advise patient today at [phone].

## 2017-11-29 NOTE — TELEPHONE ENCOUNTER
Per his note, After extensive discussion, she wishes to proceed with surgery, but wait until after her auto show. She also wishes to have surgery with me as opposed to the neurosurgeons at [facility]. Evidently, we are both about equally distant from her home. I think either location would be fine obviously.     We will proceed with surgery once the appropriate approvals and clearances take place.       Is she ready to have surgery? We will not be taking over her chronic pain management.

## 2017-11-29 NOTE — TELEPHONE ENCOUNTER
Spoke with patient, informed of JACKELINE Oconnell message below. Patient stated that she had the [address] on 8/14 with [facility] and now the screw is hanging from her neck and she has pictures. Patient stated she was sorry for not going with Dr. Tom Thomas to do her neck SX. Patient is requesting to be added to his schedule for [place] stating that something is wrong. Patient stated she has pictures, but they were not printouts they were taken with cell phone. Patient has no new images since the [address] just the ones that were reviewed during June visit. Please advise.

## 2017-11-29 NOTE — TELEPHONE ENCOUNTER
We have not seen this patient since June. We can not prescribe any pain medications over the phone. I do not see where she has an apt for 12/12. She has 2 cx apts from August but nothing rescheduled.
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
## 2017-12-01 NOTE — TELEPHONE ENCOUNTER
Patient called back to check the status.   I relayed the below message and scheduled her for [place] with Dr. Blaire Erickson for 12-15-17 @ 2pm.

## 2017-12-15 ENCOUNTER — OFFICE VISIT (OUTPATIENT)
Dept: ORTHOPEDIC SURGERY | Age: 47
End: 2017-12-15

## 2018-01-09 ENCOUNTER — OFFICE VISIT (OUTPATIENT)
Dept: ORTHOPEDIC SURGERY | Age: 48
End: 2018-01-09

## 2018-01-09 VITALS
SYSTOLIC BLOOD PRESSURE: 116 MMHG | RESPIRATION RATE: 16 BRPM | HEART RATE: 71 BPM | TEMPERATURE: 97.6 F | DIASTOLIC BLOOD PRESSURE: 78 MMHG

## 2018-01-09 DIAGNOSIS — M48.02 CERVICAL SPINAL STENOSIS: ICD-10-CM

## 2018-01-09 DIAGNOSIS — Z98.1 HISTORY OF FUSION OF CERVICAL SPINE: ICD-10-CM

## 2018-01-09 DIAGNOSIS — M40.202 KYPHOSIS OF CERVICAL REGION, UNSPECIFIED KYPHOSIS TYPE: Primary | ICD-10-CM

## 2018-01-09 NOTE — MR AVS SNAPSHOT
Visit Information Date & Time Provider Department Dept. Phone Encounter #  
 1/9/2018 12:50 PM Jarod Banerjee MD [facility] [phone] [id number] Follow-up Instructions Return for MRI/CT f/u. Follow-up and Disposition History Upcoming Health Maintenance Date Due HEMOGLOBIN A1C Q6M 1970 LIPID PANEL Q1 1970 FOOT EXAM Q1 8/6/1978 MICROALBUMIN Q1 8/6/1978 EYE EXAM RETINAL OR DILATED Q1 8/6/1978 Pneumococcal 19-64 Medium Risk (1 of 1 - PPSV23) 8/6/1987 DTaP/Tdap/Td series (1 - Tdap) 8/6/1989 PAP AKA CERVICAL CYTOLOGY 8/6/1989 Influenza Age 5 to Adult 8/1/2017 COLONOSCOPY 1/14/2019 Allergies as of 1/9/2018  Review Complete On: 1/9/2018 By: Jarod Banerjee MD  
  
 Severity Noted Reaction Type Reactions Aspirin High   Anaphylaxis Nsaids (Non-steroidal Anti-inflammatory Drug) High 08/01/2014    Anaphylaxis Pcn [Penicillins]    Shortness of Breath, Swelling Tramadol  03/15/2017    Shortness of Breath Asthma symptoms Current Immunizations  Never Reviewed No immunizations on file. Not reviewed this visit You Were Diagnosed With   
  
 Codes Comments Kyphosis of cervical region, unspecified kyphosis type    -  Primary ICD-10-CM: M40.202 ICD-9-CM: 737.10 Cervical spinal stenosis     ICD-10-CM: M48.02 
ICD-9-CM: 723.0 History of fusion of cervical spine     ICD-10-CM: Z98.1 ICD-9-CM: V45.4 C3-7  8/14/17 Dr. Brandon Novak Vitals BP Pulse Temp Resp OB Status Smoking Status 116/78 (BP 1 Location: Left arm, BP Patient Position: Sitting) 71 97.6 °F (36.4 °C) (Oral) 16 Hysterectomy Current Every Day Smoker Preferred Pharmacy Pharmacy Name Phone [facility] [address] [phone] Your Updated Medication List  
  
   
This list is accurate as of: 1/9/18  3:06 PM.  Always use your most recent med list.  
  
  
  
  
 acetaminophen 500 mg tablet Commonly known as:  TYLENOL Take 1,000 mg by mouth four (4) times daily as needed. AMBIEN 10 mg tablet Generic drug:  zolpidem Take  by mouth nightly as needed for Sleep. * ATIVAN 1 mg tablet Generic drug:  LORazepam  
Take 2 mg by mouth three (3) times daily. * ATIVAN 2 mg tablet Generic drug:  LORazepam  
Reported on 4/1/2017  
  
 atorvastatin 20 mg tablet Commonly known as:  LIPITOR Take 20 mg by mouth daily. cyanocobalamin 1,000 mcg/mL injection Commonly known as:  VITAMIN B12  
1,000 mcg every seven (7) days. Cyclobenzaprine 15 mg Cp24 SR capsule Commonly known as:  AMRIX Take 1-2 caps at 6 pm daily for muscle spasm. * CYMBALTA 60 mg capsule Generic drug:  DULoxetine Take 60 mg by mouth two (2) times a day. * DULoxetine 30 mg capsule Commonly known as:  CYMBALTA 1 in the am and 1 po qhs GABAPENTIN PO Take  by mouth three (3) times daily. KLOR-CON M20 20 mEq tablet Generic drug:  potassium chloride Take 20 mEq by mouth as needed. LASIX 40 mg tablet Generic drug:  furosemide Take 40 mg by mouth two (2) times daily as needed. latanoprost 0.005 % ophthalmic solution Commonly known as:  XALATAN  
1 Drop.  
  
 lidocaine 5 % Commonly known as:  Sherren Gruber Apply up to 3 patches for 12 hours/day. ondansetron 4 mg disintegrating tablet Commonly known as:  ZOFRAN ODT Take 1 Tab by mouth every eight (8) hours as needed for Nausea. * oxyCODONE IR 10 mg Tab immediate release tablet Commonly known as:  Garfield Miners Take 10 mg by mouth. Every 4-6 hours * oxyCODONE IR 20 mg immediate release tablet Commonly known as:  Garfield Miners Take 1 Tab by mouth every eight (8) hours as needed. Max Daily Amount: 60 mg.  
  
 * oxyCODONE IR 20 mg immediate release tablet Commonly known as:  Garfield Patel  
 Take 1 Tab by mouth three (3) times daily. Max Daily Amount: 60 mg.  
  
 * oxyCODONE ER 15 mg ER tablet Commonly known as:  OxyCONTIN Take 1 Tab by mouth every twelve (12) hours. Max Daily Amount: 30 mg. Indications: CHRONIC PAIN  
  
 * OxyCONTIN 30 mg Tr12 Generic drug:  oxyCODONE  
  
 * oxyCODONE CR 30 mg CR tablet Commonly known as:  OxyCONTIN Take 1 Tab by mouth every twelve (12) hours. Max Daily Amount: 60 mg.  
  
 pantoprazole 40 mg tablet Commonly known as:  PROTONIX Take 40 mg by mouth two (2) times a day. PHENERGAN PO Take 25 mg by mouth as needed. pramipexole 0.5 mg tablet Commonly known as:  MIRAPEX Take 0.5 mg by mouth nightly. predniSONE 10 mg tablet Commonly known as:  Bradd Buffy Take 30 mg by mouth daily as needed. Dose pack as needed SUMAtriptan succinate 6 mg/0.5 mL kit  
0.6 mg by SubCUTAneous route. tiZANidine 6 mg capsule Commonly known as:  ZANAFLEX  
6 mg.  
  
 triamcinolone 55 mcg nasal inhaler Commonly known as:  NASACORT AQ  
2 Sprays by Both Nostrils route daily. VITAMIN D2 50,000 unit capsule Generic drug:  ergocalciferol Take 50,000 Units by mouth every seven (7) days. ZANTAC 300 mg tablet Generic drug:  raNITIdine Take 300 mg by mouth daily. Takes at night * Notice: This list has 10 medication(s) that are the same as other medications prescribed for you. Read the directions carefully, and ask your doctor or other care provider to review them with you. We Performed the Following AMB POC XRAY, SPINE, CERVICAL; 2 OR 3 [01229 CPT(R)] Follow-up Instructions Return for MRI/CT f/u. To-Do List   
 01/09/2018 Imaging:  CT SPINE CERV WO CONT   
  
 01/13/2018 10:00 AM  
  Appointment with THE [facility] CT RM 1 at THE [facility] CT ([phone]) GENERAL INSTRUCTIONS  This study does not require you to drink contrast prior to your study.   RELATED STUDY INFORMATION  Bring any films, CDs, and reports related with you on the day of your exam.  This only includes studies done outside of [address] [place] [address], [place] [address], [place], and [place]. QUESTIONS  Notify the CT Department if you have any questions concerning your study. [place] - [phone] [facility] [place] - [phone] Introducing [facility]! Yvette South introduces [employer] patient portal. Now you can access parts of your medical record, email your doctor's office, and request medication refills online. 1. In your internet browser, go to [email or web address] 2. Click on the First Time User? Click Here link in the Sign In box. You will see the New Member Sign Up page. 3. Enter your [employer] Access Code exactly as it appears below. You will not need to use this code after youve completed the sign-up process. If you do not sign up before the expiration date, you must request a new code. · [employer] Access Code: [email or web address] Expires: 4/9/2018  2:58 PM 
 
4. Enter the last four digits of your Social Security Number (xxxx) and Date of Birth (mm/dd/yyyy) as indicated and click Submit. You will be taken to the next sign-up page. 5. Create a [employer] ID. This will be your [employer] login ID and cannot be changed, so think of one that is secure and easy to remember. 6. Create a [employer] password. You can change your password at any time. 7. Enter your Password Reset Question and Answer. This can be used at a later time if you forget your password. 8. Enter your e-mail address. You will receive e-mail notification when new information is available in [address] 9. Click Sign Up. You can now view and download portions of your medical record. 10. Click the Download Summary menu link to download a portable copy of your medical information.  
 
If you have questions, please visit the Frequently Asked Questions section of the [employer]. Remember, [employer] is NOT to be used for urgent needs. For medical emergencies, dial 911. Now available from your iPhone and Android! Please provide this summary of care documentation to your next provider. Your primary care clinician is listed as Odilia Ruiz. If you have any questions after today's visit, please call [phone].
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
## 2018-01-24 ENCOUNTER — HOSPITAL ENCOUNTER (OUTPATIENT)
Dept: CT IMAGING | Age: 48
Discharge: HOME | End: 2018-01-24
Payer: SELF-PAY

## 2018-01-24 DIAGNOSIS — M40.202 KYPHOSIS OF CERVICAL REGION, UNSPECIFIED KYPHOSIS TYPE: ICD-10-CM

## 2018-01-24 DIAGNOSIS — M48.02 CERVICAL SPINAL STENOSIS: ICD-10-CM

## 2018-01-24 DIAGNOSIS — Z98.1 HISTORY OF FUSION OF CERVICAL SPINE: ICD-10-CM

## 2018-01-24 PROCEDURE — 72125 CT NECK SPINE W/O DYE: CPT

## 2018-01-25 ENCOUNTER — TELEPHONE (OUTPATIENT)
Dept: ORTHOPEDIC SURGERY | Age: 48
End: 2018-01-25

## 2018-01-25 NOTE — TELEPHONE ENCOUNTER
PATIENT CALLED FOR DR. MOBLEY OR A NURSE. PATIENT SAID THAT SHE HAD THE CT SCAN DONE AND HAS AN APPOINTMENT TO SEE DR. MOBLEY ON 02/02/18. PATIENT SAID THAT SHE HAS A HARD TIME STANDING UP, FALLING, DIZZY AND KNOTS. PATIENT IS REQUESTING TO SPEAK TO SOMEONE ABOUT THIS. PATIENT TEL. [phone].

## 2018-01-26 ENCOUNTER — OFFICE VISIT (OUTPATIENT)
Dept: ORTHOPEDIC SURGERY | Age: 48
End: 2018-01-26

## 2018-01-26 VITALS
BODY MASS INDEX: 21.72 KG/M2 | DIASTOLIC BLOOD PRESSURE: 73 MMHG | SYSTOLIC BLOOD PRESSURE: 128 MMHG | TEMPERATURE: 97.4 F | HEART RATE: 98 BPM | OXYGEN SATURATION: 100 % | WEIGHT: 127.2 LBS | HEIGHT: 64 IN

## 2018-01-26 DIAGNOSIS — M96.0 PSEUDARTHROSIS AFTER FUSION OR ARTHRODESIS: ICD-10-CM

## 2018-01-26 DIAGNOSIS — M48.02 CERVICAL SPINAL STENOSIS: ICD-10-CM

## 2018-01-26 DIAGNOSIS — M43.12 SPONDYLOLISTHESIS OF CERVICAL REGION: Primary | ICD-10-CM

## 2018-01-26 DIAGNOSIS — Z98.1 HISTORY OF FUSION OF CERVICAL SPINE: ICD-10-CM

## 2018-01-26 NOTE — MR AVS SNAPSHOT
[facility] 
 
 
 [address] [facility] [address] 
[phone] Patient: Graciela Kuhn MRN: [id number] Mickey Nava Visit Information Date & Time Provider Department Dept. Phone Encounter #  
 1/26/2018 12:50 PM Micaela Rojo  [address], [address] [facility]  [phone] Follow-up Instructions Return if symptoms worsen or fail to improve. Your Appointments 2/2/2018  1:15 PM  
DIAG TEST F/U with Micaela Rojo MD  
[address], [address] and [facility] ONE [address]) Appt Note: CT F/U [facility] DISK  
 [address] [facility] [address]  
[phone]  
  
   
 [address] [facility] [address] Upcoming Health Maintenance Date Due HEMOGLOBIN A1C Q6M 1970 LIPID PANEL Q1 1970 FOOT EXAM Q1 8/6/1978 MICROALBUMIN Q1 8/6/1978 EYE EXAM RETINAL OR DILATED Q1 8/6/1978 Pneumococcal 19-64 Medium Risk (1 of 1 - PPSV23) 8/6/1987 DTaP/Tdap/Td series (1 - Tdap) 8/6/1989 PAP AKA CERVICAL CYTOLOGY 8/6/1989 Influenza Age 5 to Adult 8/1/2017 COLONOSCOPY 1/14/2019 Allergies as of 1/26/2018  Review Complete On: 1/26/2018 By: Brielle Horn LPN Severity Noted Reaction Type Reactions Aspirin High   Anaphylaxis Nsaids (Non-steroidal Anti-inflammatory Drug) High 08/01/2014    Anaphylaxis Pcn [Penicillins]    Shortness of Breath, Swelling Tramadol  03/15/2017    Shortness of Breath Asthma symptoms Current Immunizations  Never Reviewed No immunizations on file. Not reviewed this visit You Were Diagnosed With   
  
 Codes Comments Spondylolisthesis of cervical region    -  Primary ICD-10-CM: M43.12 
ICD-9-CM: 738.4 Cervical spinal stenosis     ICD-10-CM: M48.02 
ICD-9-CM: 723.0 Pseudarthrosis after fusion or arthrodesis     ICD-10-CM: M96.0 ICD-9-CM: 996.49, V45.4 History of fusion of cervical spine     ICD-10-CM: Z98.1 ICD-9-CM: V45.4 Vitals BP Pulse Temp Height(growth percentile) Weight(growth percentile) SpO2  
 128/73 98 97.4 °F (36.3 °C) (Oral) 5' 4\" (1.626 m) 127 lb 3.2 oz (57.7 kg) 100% BMI OB Status Smoking Status 21.83 kg/m2 Hysterectomy Current Every Day Smoker Vitals History BMI and BSA Data Body Mass Index Body Surface Area  
 21.83 kg/m 2 1.61 m 2 Preferred Pharmacy Pharmacy Name Phone [facility] [phone] Your Updated Medication List  
  
   
This list is accurate as of: 1/26/18  2:06 PM.  Always use your most recent med list.  
  
  
  
  
 acetaminophen 500 mg tablet Commonly known as:  TYLENOL Take 1,000 mg by mouth four (4) times daily as needed. AMBIEN 10 mg tablet Generic drug:  zolpidem Take  by mouth nightly as needed for Sleep. * ATIVAN 1 mg tablet Generic drug:  LORazepam  
Take 2 mg by mouth three (3) times daily. * ATIVAN 2 mg tablet Generic drug:  LORazepam  
Reported on 4/1/2017  
  
 atorvastatin 20 mg tablet Commonly known as:  LIPITOR Take 20 mg by mouth daily. cyanocobalamin 1,000 mcg/mL injection Commonly known as:  VITAMIN B12  
1,000 mcg every seven (7) days. Cyclobenzaprine 15 mg Cp24 SR capsule Commonly known as:  AMRIX Take 1-2 caps at 6 pm daily for muscle spasm. * CYMBALTA 60 mg capsule Generic drug:  DULoxetine Take 60 mg by mouth two (2) times a day. * DULoxetine 30 mg capsule Commonly known as:  CYMBALTA 1 in the am and 1 po qhs GABAPENTIN PO Take  by mouth three (3) times daily. KLOR-CON M20 20 mEq tablet Generic drug:  potassium chloride Take 20 mEq by mouth as needed. LASIX 40 mg tablet Generic drug:  furosemide Take 40 mg by mouth two (2) times daily as needed. latanoprost 0.005 % ophthalmic solution Commonly known as:  XALATAN  
1 Drop.  
  
 lidocaine 5 % Commonly known as:  Haley Fort Apply up to 3 patches for 12 hours/day. ondansetron 4 mg disintegrating tablet Commonly known as:  ZOFRAN ODT Take 1 Tab by mouth every eight (8) hours as needed for Nausea. * oxyCODONE IR 10 mg Tab immediate release tablet Commonly known as:  Muriel Nolan Take 10 mg by mouth. Every 4-6 hours * oxyCODONE IR 20 mg immediate release tablet Commonly known as:  Muriel Nolan Take 1 Tab by mouth every eight (8) hours as needed. Max Daily Amount: 60 mg.  
  
 * oxyCODONE IR 20 mg immediate release tablet Commonly known as:  Muriel Nolan Take 1 Tab by mouth three (3) times daily. Max Daily Amount: 60 mg.  
  
 * oxyCODONE ER 15 mg ER tablet Commonly known as:  OxyCONTIN Take 1 Tab by mouth every twelve (12) hours. Max Daily Amount: 30 mg. Indications: CHRONIC PAIN  
  
 * OxyCONTIN 30 mg Tr12 Generic drug:  oxyCODONE  
  
 * oxyCODONE CR 30 mg CR tablet Commonly known as:  OxyCONTIN Take 1 Tab by mouth every twelve (12) hours. Max Daily Amount: 60 mg.  
  
 pantoprazole 40 mg tablet Commonly known as:  PROTONIX Take 40 mg by mouth two (2) times a day. PHENERGAN PO Take 25 mg by mouth as needed. pramipexole 0.5 mg tablet Commonly known as:  MIRAPEX Take 0.5 mg by mouth nightly. predniSONE 10 mg tablet Commonly known as:  Donneta Rosa Isela Take 30 mg by mouth daily as needed. Dose pack as needed SUMAtriptan succinate 6 mg/0.5 mL kit  
0.6 mg by SubCUTAneous route. tiZANidine 6 mg capsule Commonly known as:  ZANAFLEX  
6 mg.  
  
 triamcinolone 55 mcg nasal inhaler Commonly known as:  NASACORT AQ  
2 Sprays by Both Nostrils route daily. VITAMIN D2 50,000 unit capsule Generic drug:  ergocalciferol Take 50,000 Units by mouth every seven (7) days. ZANTAC 300 mg tablet Generic drug:  raNITIdine Take 300 mg by mouth daily. Takes at night * Notice: This list has 10 medication(s) that are the same as other medications prescribed for you. Read the directions carefully, and ask your doctor or other care provider to review them with you. We Performed the Following AMB SUPPLY ORDER [[id number] Custom] Comments:  
 Soft cervical collar REFERRAL TO SPINE SURGERY [QKR785 Custom] Comments: [facility] with Dr. Marinell Habermann Follow-up Instructions Return if symptoms worsen or fail to improve. Referral Information Referral ID Referred By Referred To  
  
 [id number] Amber Garcia MD   
   [facility] Mail Stop [address], [address] [facility] Phone: [phone] Fax: [phone] Visits Status Start Date End Date 1 New Request 1/26/18 1/26/19 If your referral has a status of pending review or denied, additional information will be sent to support the outcome of this decision. Introducing [facility]! Aristeo Walsh introduces [employer] patient portal. Now you can access parts of your medical record, email your doctor's office, and request medication refills online. 1. In your internet browser, go to [email or web address] 2. Click on the First Time User? Click Here link in the Sign In box. You will see the New Member Sign Up page. 3. Enter your [employer] Access Code exactly as it appears below. You will not need to use this code after youve completed the sign-up process. If you do not sign up before the expiration date, you must request a new code. · [employer] Access Code: [email or web address] Expires: 4/9/2018  2:58 PM 
 
4. Enter the last four digits of your Social Security Number (xxxx) and Date of Birth (mm/dd/yyyy) as indicated and click Submit. You will be taken to the next sign-up page. 5. Create a [employer] ID.  This will be your [employer] login ID and cannot be changed, so think of one that is secure and easy to remember. 6. Create a [employer] password. You can change your password at any time. 7. Enter your Password Reset Question and Answer. This can be used at a later time if you forget your password. 8. Enter your e-mail address. You will receive e-mail notification when new information is available in [address] 9. Click Sign Up. You can now view and download portions of your medical record. 10. Click the Download Summary menu link to download a portable copy of your medical information. If you have questions, please visit the Frequently Asked Questions section of the [employer] website. Remember, [employer] is NOT to be used for urgent needs. For medical emergencies, dial 911. Now available from your iPhone and Android! Please provide this summary of care documentation to your next provider. Your primary care clinician is listed as April Elspeth Height. If you have any questions after today's visit, please call [phone].

## 2018-01-26 NOTE — PROGRESS NOTES
[address], [address]  [facility], [address]  Phone: [phone]  Fax: [phone]  PROGRESS NOTE  Patient: Dinh Salazar                MRN: [id number]       SSN: [id number]  YOB: 1970        AGE: 50 y.o. SEX: female  Body mass index is 21.83 kg/(m^2). PCP: Odilia Cabrera MD  01/26/18    Chief Complaint   Patient presents with    Neck Pain     CT follow up        HISTORY OF PRESENT ILLNESS, RADIOGRAPHS, and PLAN:     HISTORY:  Ms. Noemy Cedeño returns today. She is continuing to have neck pain, episodic, myelopathic progressions, flares of symptoms with neck movement. She feels like she is becoming more kyphotic. RADIOGRAPHS:  I obtained the CT scan of her neck and It showed as I had suspected. She has cut-out of her C3 lateral mass screws and loss of fixation at her C7 lateral mass screws. She has become progressively kyphotic. She is unstable from a posterior cervical decompression with instrumentation that she had. ASSESSMENT/PLAN: I believe she requires a revision decompression and stabilization. I would probably take care of this as a combined anterior posterior procedure with revision of her fusion to C2 posteriorly, as well as down to either the C7 pedicles or T1 and anteriorly C3-4 and possibly down to the cervicothoracic junction as well. I have explained to her, given her history, the complexity of the revision of her previous cervical laminectomy. I think she would be best-served to go to a tertiary referral center. Again, [place] [facility] I believe she is disaffected from the surgeon who did her index surgery. After discussion with her, I am going to refer her to Dr. Rochelle Byrd at the [address] [place], [place] [address] for evaluation and treatment as he deems fit.       cc: Odilia Cabrera MD         Past Medical History:   Diagnosis Date    Arthritis     spondyloarthropathy    Asthma H/O as a child    Cancer [facility]) [id number]    breast left    vulva    Chronic kidney disease     Depression     depression with anxiety    Diabetes ([facility] [address]     none since 2008    Endometriosis     Fibromyalgia     GERD (gastroesophageal reflux disease)     GI disease     High cholesterol     High triglycerides     History of fusion of cervical spine 1/9/2018    Posterior C3-7  8/14/17 Dr. Yanet Monaco. Previous C5-6 ACDF    Hypertension     2008 none since bypass    IBS (irritable bowel syndrome)     with chronic diarrhea and constipation    Lupus     Migraines     Morbid obesity ([facility] [address]     Osteopenia     PUD (peptic ulcer disease)     Rheumatoid arthritis(714.0)     Sinus trouble     SOB (shortness of breath) on exertion        Family History   Problem Relation Age of Onset    Cancer Mother      cervical    High Cholesterol Father     Cancer Father     Cancer Maternal Grandmother     Heart Attack Paternal Grandfather     Diabetes Maternal Aunt     Diabetes Maternal Uncle        Current Outpatient Prescriptions   Medication Sig Dispense Refill    Cyclobenzaprine (AMRIX) 15 mg cp24 SR capsule Take 1-2 caps at 6 pm daily for muscle spasm.  lidocaine (LIDODERM) 5 % Apply up to 3 patches for 12 hours/day.  SUMAtriptan succinate 6 mg/0.5 mL kit 0.6 mg by SubCUTAneous route.  latanoprost (XALATAN) 0.005 % ophthalmic solution 1 Drop.  DULoxetine (CYMBALTA) 30 mg capsule 1 in the am and 1 po qhs      LORazepam (ATIVAN) 2 mg tablet Reported on 4/1/2017      oxyCODONE IR (ROXICODONE) 10 mg tab immediate release tablet Take 10 mg by mouth. Every 4-6 hours      GABAPENTIN PO Take  by mouth three (3) times daily.  atorvastatin (LIPITOR) 20 mg tablet Take 20 mg by mouth daily.  tiZANidine (ZANAFLEX) 6 mg capsule 6 mg.      OXYCONTIN 30 mg TR12       oxyCODONE CR (OXYCONTIN) 30 mg CR tablet Take 1 Tab by mouth every twelve (12) hours.  Max Daily Amount: 60 mg. 60 Tab 0    oxyCODONE IR (ROXICODONE) 20 mg immediate release tablet Take 1 Tab by mouth every eight (8) hours as needed. Max Daily Amount: 60 mg. 90 Tab 0    oxyCODONE IR (ROXICODONE) 20 mg immediate release tablet Take 1 Tab by mouth three (3) times daily. Max Daily Amount: 60 mg. 90 Tab 0    oxyCODONE ER (OXYCONTIN) 15 mg ER tablet Take 1 Tab by mouth every twelve (12) hours. Max Daily Amount: 30 mg. Indications: CHRONIC PAIN 60 Tab 0    acetaminophen (TYLENOL) 500 mg tablet Take 1,000 mg by mouth four (4) times daily as needed.  pramipexole (MIRAPEX) 0.5 mg tablet Take 0.5 mg by mouth nightly.  predniSONE (DELTASONE) 10 mg tablet Take 30 mg by mouth daily as needed. Dose pack as needed      triamcinolone (NASACORT AQ) 55 mcg nasal inhaler 2 Sprays by Both Nostrils route daily.  cyanocobalamin (VITAMIN B12) 1,000 mcg/mL injection 1,000 mcg every seven (7) days.  pantoprazole (PROTONIX) 40 mg tablet Take 40 mg by mouth two (2) times a day.  DULoxetine (CYMBALTA) 60 mg capsule Take 60 mg by mouth two (2) times a day.  ergocalciferol (VITAMIN D2) 50,000 unit capsule Take 50,000 Units by mouth every seven (7) days.  potassium chloride (KLOR-CON M20) 20 mEq tablet Take 20 mEq by mouth as needed.  furosemide (LASIX) 40 mg tablet Take 40 mg by mouth two (2) times daily as needed.  LORazepam (ATIVAN) 1 mg tablet Take 2 mg by mouth three (3) times daily.  zolpidem (AMBIEN) 10 mg tablet Take  by mouth nightly as needed for Sleep.  ranitidine (ZANTAC) 300 mg tablet Take 300 mg by mouth daily. Takes at night      ondansetron (ZOFRAN ODT) 4 mg disintegrating tablet Take 1 Tab by mouth every eight (8) hours as needed for Nausea. 30 Tab 0    PROMETHAZINE HCL (PHENERGAN PO) Take 25 mg by mouth as needed.          Allergies   Allergen Reactions    Aspirin Anaphylaxis    Nsaids (Non-Steroidal Anti-Inflammatory Drug) Anaphylaxis    Pcn [Penicillins] Shortness of Breath and Swelling  Tramadol Shortness of Breath     Asthma symptoms       Past Surgical History:   Procedure Laterality Date    BIOPSY LIVER  07/01/2008    EGD  05/06/2009 and 3/4/2009    with biopsy    ENDOSCOPY, COLON, DIAGNOSTIC  01/14/2009    Colonoscopy with polypectom    ENDOSCOPY, COLON, DIAGNOSTIC  01/14/2009    colonoscopy with biopsy    HX BREAST LUMPECTOMY      HX CHOLECYSTECTOMY  11/18/2008    HX GASTRIC BYPASS  07/01/2008    lap-gastric bypass prodedure with a 100 cm Thiago limb bypass in antecolic and antegastric fashion    HX GYN      vulva resection    HX HYSTERECTOMY      HX OTHER SURGICAL  1982 (approx)    trach r/t mono diagnosis (tonsils swollen, unable to breathe)    HX TONSILLECTOMY      NEUROLOGICAL PROCEDURE UNLISTED      cervical fusion       Past Medical History:   Diagnosis Date    Arthritis     spondyloarthropathy    Asthma     H/O as a child    Cancer ([facility] [address] 1990's    breast left    vulva    Chronic kidney disease     Depression     depression with anxiety    Diabetes ([facility] [address]     none since 2008    Endometriosis     Fibromyalgia     GERD (gastroesophageal reflux disease)     GI disease     High cholesterol     High triglycerides     History of fusion of cervical spine 1/9/2018    Posterior C3-7  8/14/17 Dr. Gerardo Lyon. Previous C5-6 ACDF    Hypertension     2008 none since bypass    IBS (irritable bowel syndrome)     with chronic diarrhea and constipation    Lupus     Migraines     Morbid obesity (HCC)     Osteopenia     PUD (peptic ulcer disease)     Rheumatoid arthritis(714.0)     Sinus trouble     SOB (shortness of breath) on exertion        Social History     Social History    Marital status:      Spouse name: N/A    Number of children: N/A    Years of education: N/A     Occupational History    Not on file.      Social History Main Topics    Smoking status: Current Every Day Smoker     Packs/day: 1.00     Years: 1.00    Smokeless tobacco: Never Used   Filbert Free Alcohol use No    Drug use: No    Sexual activity: Yes     Partners: Male     Other Topics Concern    Not on file     Social History Narrative         REVIEW OF SYSTEMS:   CONSTITUTIONAL SYMPTOMS:  Negative. EYES:  Negative. EARS, NOSE, THROAT AND MOUTH:  Negative. CARDIOVASCULAR:  Negative. RESPIRATORY:  Negative. GENITOURINARY: Negative. GASTROINTESTINAL:  Negative. INTEGUMENTARY (SKIN AND/OR BREAST):  Negative. MUSCULOSKELETAL: Per HPI.   ENDOCRINE/RHEUMATOLOGIC:  Negative. NEUROLOGICAL:  Per HPI. HEMATOLOGIC/LYMPHATIC:  Negative. ALLERGIC/IMMUNOLOGIC:  Negative. PSYCHIATRIC:  Negative. PHYSICAL EXAMINATION:   Visit Vitals    /73    Pulse 98    Temp 97.4 °F (36.3 °C) (Oral)    Ht 5' 4\" (1.626 m)    Wt 127 lb 3.2 oz (57.7 kg)    SpO2 100%    BMI 21.83 kg/m2    PAIN SCALE: 8/10    CONSTITUTIONAL: The patient is in no apparent distress and is alert and oriented x 3. HEENT: Normocephalic. Hearing grossly intact. NECK: Supple and symmetric. no tenderness, or masses were felt. RESPIRATORY: No labored breathing. CARDIOVASCULAR: The carotid pulses were normal. Peripheral pulses were 2+. CHEST: Normal AP diameter and normal contour without any kyphoscoliosis. LYMPHATIC: No lymphadenopathy was appreciated in the neck, axillae or groin. SKIN:  Negative for scars, rashes, lesions, or ulcers on the right upper, right lower, left upper, left lower and trunk. NEUROLOGICAL: Alert and oriented x 3. Ambulation without assistive device. FWB. EXTREMITIES: See musculoskeletal.  MUSCULOSKELETAL:  · Head and Neck: Neck is forward flexed. Neck pain. Negative for misalignment, asymmetry, crepitation, defects, tenderness masses or effusions. · Left Upper Extremity: Weakness. Burning in hands. Inspection, percussion and palpation preformed. Mcdonalds sign is negative. · Right Upper Extremity: Weakness. Burning in hands. Inspection, percussion and palpation preformed.    Mcdonalds sign is negative. · Spine, Ribs and Pelvis: Inspection, percussion and palpation preformed. Negative for misalignment, asymmetry, crepitation, defects, tenderness masses or effusions. · Left Lower Extremity: burning in feet. Inspection, percussion and palpation preformed. Negative straight leg raise. · Right Lower Extremity: Burning in feet. Inspection, percussion and palpation preformed. Negative straight leg raise.         SPINE EXAM:     Cervical spine: Neck is forward flexed. No focal atrophy is noted. ASSESSMENT    ICD-10-CM ICD-9-CM    1. Spondylolisthesis of cervical region M43.12 738.4 REFERRAL TO SPINE SURGERY   2. Cervical spinal stenosis M48.02 723.0 REFERRAL TO SPINE SURGERY   3. Pseudarthrosis after fusion or arthrodesis M96.0 996.49 REFERRAL TO SPINE SURGERY     V45.4    4. History of fusion of cervical spine Z98.1 V45.4 REFERRAL TO SPINE SURGERY       Written by Osiris Chavez, as dictated by Nav Queen MD.    I, Dr. Nav Queen MD, confirm that all documentation is accurate.

## 2018-01-29 ENCOUNTER — TELEPHONE (OUTPATIENT)
Dept: ORTHOPEDIC SURGERY | Age: 48
End: 2018-01-29

## 2018-01-29 NOTE — TELEPHONE ENCOUNTER
Mady from [facility], [facility] office called in states that she needs all the information we have on the patient to make her appt that Dr. Burns Favorite is referring her for; Demographics, referral, insurance, images etc. Please fax to [facility] at [phone].

## 2018-01-31 NOTE — TELEPHONE ENCOUNTER
Mady still requires the records but she did call to let us know Ms. Celestino Gama will be applying for financial assistance with [facility] and will proceed with Dr. Villarreal Hair appointment once she receives approval. Thank you.

## 2018-01-31 NOTE — TELEPHONE ENCOUNTER
Faxed notes to Arnoldo Norris at Dr. Kadi Marrero office. Scanned fax confirmation in [address] [place] [address].

## 2018-08-16 PROBLEM — N39.0 RECURRENT UTI: Status: ACTIVE | Noted: 2018-08-16

## 2021-01-14 ENCOUNTER — TRANSCRIBE ORDER (OUTPATIENT)
Dept: SCHEDULING | Age: 51
End: 2021-01-14

## 2021-01-14 DIAGNOSIS — M54.2 CERVICAL PAIN: Primary | ICD-10-CM

## 2021-01-14 DIAGNOSIS — M48.02 STENOSIS OF CERVICAL SPINE: ICD-10-CM
